# Patient Record
Sex: FEMALE | Race: WHITE | NOT HISPANIC OR LATINO | Employment: OTHER | URBAN - NONMETROPOLITAN AREA
[De-identification: names, ages, dates, MRNs, and addresses within clinical notes are randomized per-mention and may not be internally consistent; named-entity substitution may affect disease eponyms.]

---

## 2024-04-22 ENCOUNTER — APPOINTMENT (OUTPATIENT)
Dept: CARDIOLOGY | Facility: HOSPITAL | Age: 84
End: 2024-04-22
Payer: MEDICARE

## 2024-04-22 ENCOUNTER — APPOINTMENT (OUTPATIENT)
Dept: RADIOLOGY | Facility: HOSPITAL | Age: 84
End: 2024-04-22
Payer: MEDICARE

## 2024-04-22 ENCOUNTER — HOSPITAL ENCOUNTER (EMERGENCY)
Facility: HOSPITAL | Age: 84
Discharge: HOME | End: 2024-04-22
Attending: EMERGENCY MEDICINE
Payer: MEDICARE

## 2024-04-22 VITALS
DIASTOLIC BLOOD PRESSURE: 75 MMHG | WEIGHT: 191.14 LBS | SYSTOLIC BLOOD PRESSURE: 148 MMHG | BODY MASS INDEX: 33.87 KG/M2 | RESPIRATION RATE: 20 BRPM | TEMPERATURE: 98 F | HEART RATE: 84 BPM | HEIGHT: 63 IN | OXYGEN SATURATION: 96 %

## 2024-04-22 DIAGNOSIS — H70.93 MASTOIDITIS OF BOTH SIDES: Primary | ICD-10-CM

## 2024-04-22 LAB
ALBUMIN SERPL BCP-MCNC: 4 G/DL (ref 3.4–5)
ALP SERPL-CCNC: 55 U/L (ref 33–136)
ALT SERPL W P-5'-P-CCNC: 25 U/L (ref 7–45)
ANION GAP SERPL CALC-SCNC: 10 MMOL/L (ref 10–20)
AST SERPL W P-5'-P-CCNC: 24 U/L (ref 9–39)
BASOPHILS # BLD AUTO: 0.07 X10*3/UL (ref 0–0.1)
BASOPHILS NFR BLD AUTO: 0.9 %
BILIRUB SERPL-MCNC: 0.4 MG/DL (ref 0–1.2)
BUN SERPL-MCNC: 24 MG/DL (ref 6–23)
CALCIUM SERPL-MCNC: 9.5 MG/DL (ref 8.6–10.3)
CARDIAC TROPONIN I PNL SERPL HS: 4 NG/L (ref 0–13)
CHLORIDE SERPL-SCNC: 98 MMOL/L (ref 98–107)
CO2 SERPL-SCNC: 31 MMOL/L (ref 21–32)
CREAT SERPL-MCNC: 0.96 MG/DL (ref 0.5–1.05)
EGFRCR SERPLBLD CKD-EPI 2021: 58 ML/MIN/1.73M*2
EOSINOPHIL # BLD AUTO: 0.15 X10*3/UL (ref 0–0.4)
EOSINOPHIL NFR BLD AUTO: 1.9 %
ERYTHROCYTE [DISTWIDTH] IN BLOOD BY AUTOMATED COUNT: 12.6 % (ref 11.5–14.5)
GLUCOSE SERPL-MCNC: 116 MG/DL (ref 74–99)
HCT VFR BLD AUTO: 42.9 % (ref 36–46)
HGB BLD-MCNC: 13.7 G/DL (ref 12–16)
HOLD SPECIMEN: NORMAL
IMM GRANULOCYTES # BLD AUTO: 0.04 X10*3/UL (ref 0–0.5)
IMM GRANULOCYTES NFR BLD AUTO: 0.5 % (ref 0–0.9)
LYMPHOCYTES # BLD AUTO: 1.38 X10*3/UL (ref 0.8–3)
LYMPHOCYTES NFR BLD AUTO: 17.2 %
MCH RBC QN AUTO: 28.4 PG (ref 26–34)
MCHC RBC AUTO-ENTMCNC: 31.9 G/DL (ref 32–36)
MCV RBC AUTO: 89 FL (ref 80–100)
MONOCYTES # BLD AUTO: 0.47 X10*3/UL (ref 0.05–0.8)
MONOCYTES NFR BLD AUTO: 5.9 %
NEUTROPHILS # BLD AUTO: 5.92 X10*3/UL (ref 1.6–5.5)
NEUTROPHILS NFR BLD AUTO: 73.6 %
NRBC BLD-RTO: 0 /100 WBCS (ref 0–0)
PLATELET # BLD AUTO: 265 X10*3/UL (ref 150–450)
POTASSIUM SERPL-SCNC: 4.5 MMOL/L (ref 3.5–5.3)
PROT SERPL-MCNC: 7.1 G/DL (ref 6.4–8.2)
RBC # BLD AUTO: 4.83 X10*6/UL (ref 4–5.2)
SODIUM SERPL-SCNC: 134 MMOL/L (ref 136–145)
WBC # BLD AUTO: 8 X10*3/UL (ref 4.4–11.3)

## 2024-04-22 PROCEDURE — 2500000001 HC RX 250 WO HCPCS SELF ADMINISTERED DRUGS (ALT 637 FOR MEDICARE OP)

## 2024-04-22 PROCEDURE — 84484 ASSAY OF TROPONIN QUANT: CPT | Performed by: EMERGENCY MEDICINE

## 2024-04-22 PROCEDURE — 70450 CT HEAD/BRAIN W/O DYE: CPT

## 2024-04-22 PROCEDURE — 85025 COMPLETE CBC W/AUTO DIFF WBC: CPT | Performed by: EMERGENCY MEDICINE

## 2024-04-22 PROCEDURE — 99285 EMERGENCY DEPT VISIT HI MDM: CPT | Mod: 25

## 2024-04-22 PROCEDURE — 70450 CT HEAD/BRAIN W/O DYE: CPT | Performed by: RADIOLOGY

## 2024-04-22 PROCEDURE — 36415 COLL VENOUS BLD VENIPUNCTURE: CPT | Performed by: EMERGENCY MEDICINE

## 2024-04-22 PROCEDURE — 2500000001 HC RX 250 WO HCPCS SELF ADMINISTERED DRUGS (ALT 637 FOR MEDICARE OP): Performed by: EMERGENCY MEDICINE

## 2024-04-22 PROCEDURE — 80053 COMPREHEN METABOLIC PANEL: CPT | Performed by: EMERGENCY MEDICINE

## 2024-04-22 PROCEDURE — 93005 ELECTROCARDIOGRAM TRACING: CPT

## 2024-04-22 RX ORDER — OMEPRAZOLE 20 MG/1
20 CAPSULE, DELAYED RELEASE ORAL DAILY
COMMUNITY

## 2024-04-22 RX ORDER — MECLIZINE HYDROCHLORIDE 25 MG/1
25 TABLET ORAL 4 TIMES DAILY
Qty: 28 TABLET | Refills: 0 | Status: SHIPPED | OUTPATIENT
Start: 2024-04-22 | End: 2024-04-29

## 2024-04-22 RX ORDER — AMITRIPTYLINE HYDROCHLORIDE 25 MG/1
250 TABLET, FILM COATED ORAL NIGHTLY
COMMUNITY
Start: 2023-12-08

## 2024-04-22 RX ORDER — LOVASTATIN 20 MG/1
20 TABLET ORAL NIGHTLY
COMMUNITY

## 2024-04-22 RX ORDER — METOPROLOL SUCCINATE 100 MG/1
100 TABLET, EXTENDED RELEASE ORAL DAILY
COMMUNITY
Start: 2023-12-11

## 2024-04-22 RX ORDER — AMOXICILLIN AND CLAVULANATE POTASSIUM 875; 125 MG/1; MG/1
1 TABLET, FILM COATED ORAL ONCE
Status: COMPLETED | OUTPATIENT
Start: 2024-04-22 | End: 2024-04-22

## 2024-04-22 RX ORDER — LISINOPRIL AND HYDROCHLOROTHIAZIDE 12.5; 2 MG/1; MG/1
1 TABLET ORAL DAILY
COMMUNITY

## 2024-04-22 RX ORDER — AMOXICILLIN AND CLAVULANATE POTASSIUM 875; 125 MG/1; MG/1
1 TABLET, FILM COATED ORAL EVERY 12 HOURS
Qty: 14 TABLET | Refills: 0 | Status: SHIPPED | OUTPATIENT
Start: 2024-04-22 | End: 2024-04-29

## 2024-04-22 RX ORDER — FLUOXETINE HYDROCHLORIDE 20 MG/1
40 CAPSULE ORAL DAILY
COMMUNITY

## 2024-04-22 RX ORDER — MECLIZINE HYDROCHLORIDE 25 MG/1
25 TABLET ORAL ONCE
Status: COMPLETED | OUTPATIENT
Start: 2024-04-22 | End: 2024-04-22

## 2024-04-22 RX ORDER — AMOXICILLIN AND CLAVULANATE POTASSIUM 875; 125 MG/1; MG/1
TABLET, FILM COATED ORAL
Status: COMPLETED
Start: 2024-04-22 | End: 2024-04-22

## 2024-04-22 RX ORDER — LORATADINE 10 MG/1
10 TABLET ORAL DAILY
Qty: 20 TABLET | Refills: 0 | Status: SHIPPED | OUTPATIENT
Start: 2024-04-22 | End: 2024-05-12

## 2024-04-22 RX ADMIN — AMOXICILLIN AND CLAVULANATE POTASSIUM 1 TABLET: 875; 125 TABLET, FILM COATED ORAL at 15:03

## 2024-04-22 RX ADMIN — MECLIZINE HYDROCHLORIDE 25 MG: 25 TABLET ORAL at 14:07

## 2024-04-22 ASSESSMENT — PAIN SCALES - GENERAL
PAINLEVEL_OUTOF10: 0 - NO PAIN
PAINLEVEL_OUTOF10: 0 - NO PAIN

## 2024-04-22 ASSESSMENT — PAIN - FUNCTIONAL ASSESSMENT: PAIN_FUNCTIONAL_ASSESSMENT: 0-10

## 2024-04-22 NOTE — ED PROVIDER NOTES
[facility]   ED  Provider Note  4/22/2024  1:32 PM  AC02/AC02      Chief Complaint   Patient presents with    Shortness of Breath        History of Present Illness:   Anais Gamez is a 83 y.o. female presenting to the ED for shortness of breath, decreased hearing of the left ear, lightheadedness, nausea beginning a few days ago.  The complaint has been intermittent, moderate in severity, and worsened by nothing.  Patient has a long history of COPD.  She has chronic shortness of breath.  She states the reason she came to the ER today is because she has decreased hearing for left ear and is feeling lightheaded with nausea.  She denies any actual vomiting.  She had no change in oral intake.  Is been no recent change in medication.  She denies any diarrhea or constipation.  She denies abdominal pain.      Review of Systems:   Pertinent positives and review of systems as noted above.  Remaining 10 review of systems is negative or noncontributory to today's episode of care.  Review of Systems       --------------------------------------------- PAST HISTORY ---------------------------------------------  Past Medical History:   Past Medical History:   Diagnosis Date    Hypertension    CKD, restless leg, Diabetes, breast cancer post mastectomy     Past Surgical History: History reviewed. No pertinent surgical history.     Social History:   Social History     Social History Narrative    Not on file        Family History: family history is not on file. Unless otherwise noted, family history is non contributory    Patient's Medications    No medications on file      The patient’s home medications have been reviewed.    Allergies: Patient has no known allergies.    -------------------------------------------------- RESULTS -------------------------------------------------  All laboratory and radiology results have been personally reviewed by myself   LABS:  Labs Reviewed   CBC WITH AUTO DIFFERENTIAL - Abnormal       Result  Value    WBC 8.0      nRBC 0.0      RBC 4.83      Hemoglobin 13.7      Hematocrit 42.9      MCV 89      MCH 28.4      MCHC 31.9 (*)     RDW 12.6      Platelets 265      Neutrophils % 73.6      Immature Granulocytes %, Automated 0.5      Lymphocytes % 17.2      Monocytes % 5.9      Eosinophils % 1.9      Basophils % 0.9      Neutrophils Absolute 5.92 (*)     Immature Granulocytes Absolute, Automated 0.04      Lymphocytes Absolute 1.38      Monocytes Absolute 0.47      Eosinophils Absolute 0.15      Basophils Absolute 0.07     COMPREHENSIVE METABOLIC PANEL - Abnormal    Glucose 116 (*)     Sodium 134 (*)     Potassium 4.5      Chloride 98      Bicarbonate 31      Anion Gap 10      Urea Nitrogen 24 (*)     Creatinine 0.96      eGFR 58 (*)     Calcium 9.5      Albumin 4.0      Alkaline Phosphatase 55      Total Protein 7.1      AST 24      Bilirubin, Total 0.4      ALT 25     TROPONIN I, HIGH SENSITIVITY     EKG: Normal sinus rhythm at 86 bpm, loss of anterior episode, nonspecific ST changes, low voltage QRS complexes, no acute ST elevations.  Interpreted by SANTIAGO Hamm MD    RADIOLOGY:  Interpreted by Radiologist.  CT head wo IV contrast   Final Result   No acute intracranial pathologic findings are identified.   Age-related intracranial findings are present.   Opacification of the mastoid air cells with some soft tissue density   also noted within the middle ear cavities bilaterally, more marked on   the right. Findings raise the possibility of underlying otitis media   and mastoiditis bilaterally. Soft tissue densities within middle ear   cavity is may also be secondary to other entities such as   cholesteatoma.        MACRO:   none        Signed by: Michel Del Cid 4/22/2024 2:33 PM   Dictation workstation:   [id number]          No results found for this or any previous visit (from the past 4464 hour(s)).  ------------------------- NURSING NOTES AND VITALS REVIEWED ---------------------------   The nursing notes  "within the ED encounter and vital signs as below have been reviewed.   BP (!) 146/109   Pulse 91   Temp 36.7 °C (98 °F) (Tympanic)   Resp 18   Ht 1.6 m (5' 3\")   Wt 86.7 kg (191 lb 2.2 oz)   SpO2 (!) 89%   BMI 33.86 kg/m²   Oxygen Saturation Interpretation: Normal      ---------------------------------------------------PHYSICAL EXAM--------------------------------------  Physical Exam   Constitutional/General: Alert and oriented x3, well appearing, non toxic in NAD  Head: Normocephalic and atraumatic  Eyes: PERRL, EOMI, conjunctiva normal, sclera non icteric  Ears: Patient has bilateral serous otitis without evidence of erythema.  She does have mild mastoid tenderness as well.  Mouth: Oropharynx clear, handling secretions, no trismus, no asymmetry of the posterior oropharynx or uvular edema  Neck: Supple, full ROM, non tender to palpation in the midline, no stridor, no crepitus, no meningeal signs  Respiratory: Lungs clear to auscultation bilaterally, no wheezes, rales, or rhonchi. Not in respiratory distress  Cardiovascular:  Regular rate. Regular rhythm. No murmurs, gallops, or rubs. 2+ distal pulses  Chest: No chest wall tenderness  GI:  Abdomen Soft, Non tender, Non distended.  +BS. No organomegaly, no palpable masses,  No rebound, guarding, or rigidity.   Musculoskeletal: Moves all extremities x 4. Warm and well perfused, no clubbing, cyanosis, or edema. Capillary refill <3 seconds  Integument: skin warm and dry. No rashes.   Lymphatic: no lymphadenopathy noted  Neurologic: No focal deficits, symmetric strength 5/5 in the upper and lower extremities bilaterally  Psychiatric: Normal Affect    Procedures    ------------------------------ ED COURSE/MEDICAL DECISION MAKING----------------------  Diagnoses as of 04/22/24 1454   Mastoiditis of both sides      Patient has fluid behind your ears without significant signs of infection.  CAT scan reveals fluid in the mastoids as well.  This would explain the " patient's feeling lightheaded and dizzy.  The patient was started on Claritin and Augmentin and asked to follow-up with her primary care doctor 1 week if not better.      Medical Decision Making:   Discharged to home  Diagnoses as of 04/22/24 1454   Mastoiditis of both sides      Counseling:   The emergency provider has spoken with the patient and family member patient and daughter and discussed today’s results, in addition to providing specific details for the plan of care and counseling regarding the diagnosis and prognosis.  Questions are answered at this time and they are agreeable with the plan.      --------------------------------- IMPRESSION AND DISPOSITION ---------------------------------        IMPRESSION  1. Mastoiditis of both sides        DISPOSITION  Disposition: Discharge to home  Patient condition is fair      Billing Provider Critical Care Time: 0 minutes     Lorenzo Hamm MD  04/25/24 0823       Lorenzo Hamm MD  05/11/24 1035       Lorenzo Hamm MD  05/11/24 1037

## 2024-04-22 NOTE — DISCHARGE INSTRUCTIONS
Claritin, Augmentin and meclizine as prescribed.    Follow-up with your primary care doctor in 7 to 10 days for recheck.    Return for worsening symptoms or concerns.

## 2024-04-24 LAB
ATRIAL RATE: 86 BPM
P AXIS: 50 DEGREES
P OFFSET: 186 MS
P ONSET: 132 MS
PR INTERVAL: 200 MS
Q ONSET: 232 MS
QRS COUNT: 14 BEATS
QRS DURATION: 68 MS
QT INTERVAL: 374 MS
QTC CALCULATION(BAZETT): 447 MS
QTC FREDERICIA: 421 MS
R AXIS: -9 DEGREES
T AXIS: 44 DEGREES
T OFFSET: 419 MS
VENTRICULAR RATE: 86 BPM

## 2024-05-20 ENCOUNTER — CLINICAL SUPPORT (OUTPATIENT)
Dept: AUDIOLOGY | Facility: CLINIC | Age: 84
End: 2024-05-20
Payer: MEDICARE

## 2024-05-20 ENCOUNTER — OFFICE VISIT (OUTPATIENT)
Dept: OTOLARYNGOLOGY | Facility: CLINIC | Age: 84
End: 2024-05-20
Payer: MEDICARE

## 2024-05-20 VITALS — TEMPERATURE: 98.9 F | WEIGHT: 191 LBS | BODY MASS INDEX: 33.84 KG/M2 | HEIGHT: 63 IN

## 2024-05-20 DIAGNOSIS — H70.93 MASTOIDITIS OF BOTH SIDES: Primary | ICD-10-CM

## 2024-05-20 DIAGNOSIS — H90.6 MIXED CONDUCTIVE AND SENSORINEURAL HEARING LOSS OF BOTH EARS: ICD-10-CM

## 2024-05-20 DIAGNOSIS — H90.3 SENSORINEURAL HEARING LOSS (SNHL) OF BOTH EARS: ICD-10-CM

## 2024-05-20 DIAGNOSIS — H65.03 NON-RECURRENT ACUTE SEROUS OTITIS MEDIA OF BOTH EARS: Primary | ICD-10-CM

## 2024-05-20 DIAGNOSIS — R42 DIZZINESS: ICD-10-CM

## 2024-05-20 DIAGNOSIS — H69.93 DYSFUNCTION OF BOTH EUSTACHIAN TUBES: ICD-10-CM

## 2024-05-20 PROCEDURE — 1159F MED LIST DOCD IN RCRD: CPT | Performed by: NURSE PRACTITIONER

## 2024-05-20 PROCEDURE — 1036F TOBACCO NON-USER: CPT | Performed by: NURSE PRACTITIONER

## 2024-05-20 PROCEDURE — 92550 TYMPANOMETRY & REFLEX THRESH: CPT | Performed by: AUDIOLOGIST

## 2024-05-20 PROCEDURE — 1160F RVW MEDS BY RX/DR IN RCRD: CPT | Performed by: NURSE PRACTITIONER

## 2024-05-20 PROCEDURE — 92557 COMPREHENSIVE HEARING TEST: CPT | Performed by: AUDIOLOGIST

## 2024-05-20 PROCEDURE — 99203 OFFICE O/P NEW LOW 30 MIN: CPT | Performed by: NURSE PRACTITIONER

## 2024-05-20 RX ORDER — AZELASTINE 1 MG/ML
2 SPRAY, METERED NASAL 2 TIMES DAILY
Qty: 30 ML | Refills: 11 | Status: SHIPPED | OUTPATIENT
Start: 2024-05-20 | End: 2025-05-20

## 2024-05-20 RX ORDER — PRAMIPEXOLE DIHYDROCHLORIDE 0.5 MG/1
1 TABLET ORAL NIGHTLY
COMMUNITY
Start: 2024-02-05

## 2024-05-20 RX ORDER — FLUOXETINE HYDROCHLORIDE 40 MG/1
CAPSULE ORAL
COMMUNITY
Start: 2024-01-29

## 2024-05-20 RX ORDER — HYDROCODONE BITARTRATE AND ACETAMINOPHEN 7.5; 325 MG/1; MG/1
1 TABLET ORAL 2 TIMES DAILY PRN
COMMUNITY
Start: 2024-04-26

## 2024-05-20 RX ORDER — FLUTICASONE PROPIONATE 50 MCG
1 SPRAY, SUSPENSION (ML) NASAL 2 TIMES DAILY
Qty: 16 G | Refills: 11 | Status: SHIPPED | OUTPATIENT
Start: 2024-05-20 | End: 2025-05-20

## 2024-05-20 RX ORDER — MECLIZINE HYDROCHLORIDE 25 MG/1
TABLET ORAL
COMMUNITY
Start: 2024-05-16

## 2024-05-20 ASSESSMENT — PATIENT HEALTH QUESTIONNAIRE - PHQ9
SUM OF ALL RESPONSES TO PHQ9 QUESTIONS 1 & 2: 0
2. FEELING DOWN, DEPRESSED OR HOPELESS: NOT AT ALL
1. LITTLE INTEREST OR PLEASURE IN DOING THINGS: NOT AT ALL

## 2024-05-20 NOTE — PROGRESS NOTES
"Subjective   Patient ID: Anais Gamez is a 83 y.o. female who presents for Follow-up.  HPI  The patient is referred from the ED for evaluation of a bilateral ear infection.  When asked about ear pain, hearing loss, discharge from ear, tinnitus, aural fullness or autophony in the affected ear or ears, the patient admits to bilateral worsening of hearing loss and dizziness.  Patient lives in [place] but is staying with local family for an extended period of time.  She describes her dizziness as \"like I am about to pass out.\"  She has had a few isolated episodes of vertigo as well.  Her family states that the worst of these occurred after showering.  Family stated \"I had to sit with her for 2 hours while she was very dizzy.\"  They have checked blood pressures at home which have not been concerning.  She is taking meclizine 3 times daily and finished her oral antibiotics today.  She is not taking any nasal sprays.  When asked about a significant past otological history including history of prior ear surgery, noise exposure, exposure to ototoxic drugs or agents, and/or family history of hearing loss, the patient admits to none.    Review of Systems  A comprehensive or 10 points review of the patient´s constitutional, neurological, HEENT, pulmonary, cardiovascular and genito-urinary systems showed only those mentioned in history of present illness.    Objective   Physical Exam  Constitutional: no fever, chills, weight loss or weight gain   General appearance: Appears well, well-nourished, well groomed. No acute distress.   Communication: Normal communication   Psychiatric: Oriented to person, place and time. Normal mood and affect.   Neurologic: Cranial nerves II-XII grossly intact and symmetric bilaterally.   Head and Face:   Head: Atraumatic with no masses, lesions or scarring.   Face: Normal symmetry, no paralysis, synkinesis or facial tic. No scars or deformities.     Eyes: Conjunctiva not edematous or " erythematous   Ears: External inspection of ears with no deformity, scars or masses. Bilateral EACs clear.  Both TMs with moderate radames effusions.    Neck: Normal appearing, symmetric, trachea midline.   Cardiovascular: Examination of peripheral vascular system shows no clubbing or cyanosis.   Respiratory: No respiratory distress increased work of breathing. Inspection of the chest with symmetric chest expansion and normal respiratory effort.   Skin: No rashes in the head or neck  My interpretation of the audiogram done today is moderate to severe mixed hearing loss bilaterally.  Excellent word recognition scores and type B tympanograms with normal canal volume bilaterally.  Assessment/Plan        This patient presents for initial evaluation of acute acquired bilateral serous otitis media, bilateral mixed hearing loss, bilateral eustachian tube dysfunction, and dizziness.    Reassurance given that there is no sign of current infection.  I recommended a 6-week course of Flonase and azelastine.  Patient was instructed on proper technique.  I also recommended cycled Afrin and frequent insufflation of the ears.  Our office will contact family to arrange for possible PE tube placement prior to her going back to [place] in July.  We discussed that presyncope is not a symptom associated with peripheral vestibular disorders.  We discussed that this fluid behind her eardrums may make her feel unsteady, but should not cause presyncope or vertigo.  I would reevaluate the symptoms after her otitis media has resolved completely.  Patient and family are in agreement with the plan.  All questions were answered to patient and family's satisfaction.    This note was created using speech recognition transcription software. Despite proofreading, several typographical errors might be present that might affect the meaning of the content. Please call with any questions.      CESAR Charles-CNP 05/20/24 1:33 PM

## 2024-05-20 NOTE — PATIENT INSTRUCTIONS
Your exam and hearing test today are consistent with noninfected fluid behind both eardrums.  Recommendations:     1. Fluticasone-steroid nasal spray will be sent to your pharmacy. Use 1 spray each side morning and night ×6 weeks.  Remember to angle outward when spraying  2.  Azelastine -antihistamine nasal spray will be sent to your pharmacy.  Use 2 sprays each side morning and night x 6 weeks.  Remember to angle at work when spraying.  3. Get Afrin nasal spray which is over-the-counter. Use 2 sprays each side morning and night for 3 days in a row, then take 1 day off. You may repeat a total of 3 cycles.  4. Gently pop your ears 10 times per day    My office will contact you to be scheduled with one of my surgical partners  sometime in June.  Please contact my office 2 weeks prior to that appointment if symptoms have not changed at all and I would order a new CAT scan.    Fluid behind both eardrums may affect your equilibrium, but should not make you feel as if you are about to pass out.  I would reevaluate the symptoms after your chronic otitis media has resolved.

## 2024-05-20 NOTE — PROGRESS NOTES
Chief Complaint   Patient presents with    Hearing Loss      HISTORY:  Anais Gamez, age 83 years, was seen for audiogram in conjunction with otolaryngology appointment on 5/20/2024.  Ms. Gamez presents with complaint of hearing loss, ear pain and dizziness.  She was seen in the emergency department and was diagnosed with bilateral mastoiditis and possible cholesteatoma.  She reports she may hear better out of her right ear.  There is no ear drainage noted.  She has been on antibiotics for two weeks.    RESULTS:  Prior to testing both external auditory canals were clear and tympanic membranes visualized    Immittance and acoustic reflexes:  Immittance testing yielded TYPE B tympanograms indicating abnormal middle ear function both ears  Acoustic reflexes were present 500 - 4000 Hz both ears    Audiogram:  Moderate to profound mixed hearing loss 250 - 8000 Hz both ears  Speech reception thresholds obtained at 55 dBHL right ear and 60 dBHL left ear  Speech discrimination scores were 100% right ear and 96% left ear at 80 dBHL    IMPRESSIONS:  Abnormal middle ear function noted both ears  Moderate to profound mixed hearing loss    RECOMMENDATIONS:  1.  Follow up with otolaryngology  2.  Retest hearing levels after otological management    time: 1146 - 1206

## 2024-05-21 ENCOUNTER — APPOINTMENT (OUTPATIENT)
Dept: AUDIOLOGY | Facility: CLINIC | Age: 84
End: 2024-05-21
Payer: MEDICARE

## 2024-05-21 ENCOUNTER — APPOINTMENT (OUTPATIENT)
Dept: OTOLARYNGOLOGY | Facility: CLINIC | Age: 84
End: 2024-05-21
Payer: MEDICARE

## 2024-06-17 DIAGNOSIS — H90.6 MIXED CONDUCTIVE AND SENSORINEURAL HEARING LOSS OF BOTH EARS: Primary | ICD-10-CM

## 2024-06-25 ENCOUNTER — HOSPITAL ENCOUNTER (OUTPATIENT)
Dept: RADIOLOGY | Facility: HOSPITAL | Age: 84
Discharge: HOME | End: 2024-06-25
Payer: MEDICARE

## 2024-06-25 DIAGNOSIS — H90.6 MIXED CONDUCTIVE AND SENSORINEURAL HEARING LOSS OF BOTH EARS: ICD-10-CM

## 2024-06-25 PROCEDURE — 70480 CT ORBIT/EAR/FOSSA W/O DYE: CPT

## 2024-06-25 PROCEDURE — 70480 CT ORBIT/EAR/FOSSA W/O DYE: CPT | Performed by: RADIOLOGY

## 2024-06-27 ENCOUNTER — APPOINTMENT (OUTPATIENT)
Dept: OTOLARYNGOLOGY | Facility: CLINIC | Age: 84
End: 2024-06-27
Payer: MEDICARE

## 2024-06-27 VITALS — HEIGHT: 63 IN | TEMPERATURE: 98 F | BODY MASS INDEX: 33.83 KG/M2

## 2024-06-27 DIAGNOSIS — H65.413 CHRONIC ALLERGIC OTITIS MEDIA OF BOTH EARS: Primary | ICD-10-CM

## 2024-06-27 DIAGNOSIS — H69.93 DYSFUNCTION OF BOTH EUSTACHIAN TUBES: ICD-10-CM

## 2024-06-27 DIAGNOSIS — H90.0 CONDUCTIVE HEARING LOSS, BILATERAL: ICD-10-CM

## 2024-06-27 PROCEDURE — 1160F RVW MEDS BY RX/DR IN RCRD: CPT | Performed by: NURSE PRACTITIONER

## 2024-06-27 PROCEDURE — 1036F TOBACCO NON-USER: CPT | Performed by: NURSE PRACTITIONER

## 2024-06-27 PROCEDURE — 1159F MED LIST DOCD IN RCRD: CPT | Performed by: NURSE PRACTITIONER

## 2024-06-27 PROCEDURE — 99214 OFFICE O/P EST MOD 30 MIN: CPT | Performed by: NURSE PRACTITIONER

## 2024-06-27 PROCEDURE — 69433 CREATE EARDRUM OPENING: CPT | Performed by: NURSE PRACTITIONER

## 2024-06-27 RX ORDER — CIPROFLOXACIN AND DEXAMETHASONE 3; 1 MG/ML; MG/ML
4 SUSPENSION/ DROPS AURICULAR (OTIC) 2 TIMES DAILY
Qty: 7.5 ML | Refills: 0 | Status: SHIPPED | OUTPATIENT
Start: 2024-06-27 | End: 2024-07-04

## 2024-06-27 ASSESSMENT — PATIENT HEALTH QUESTIONNAIRE - PHQ9
SUM OF ALL RESPONSES TO PHQ9 QUESTIONS 1 AND 2: 0
1. LITTLE INTEREST OR PLEASURE IN DOING THINGS: NOT AT ALL
2. FEELING DOWN, DEPRESSED OR HOPELESS: NOT AT ALL

## 2024-06-28 NOTE — PROGRESS NOTES
Subjective   Patient ID: Anais Gamez is a 83 y.o. female who presents for Follow-up (Ct scan for ear tubes).  HPI    Patient presents today for bilateral ear tube placement.  Patient was referred by Citlalli Briggs CNP.  Patient reports that she tried topical nasal sprays to relieve the middle ear effusion but it has not cleared.  She has hearing loss in both the ears due to the fluid.    Review of Systems    All other systems have been reviewed and are negative for complaints except for those mentioned in history of present illness, past medical history and problem list       Objective   Physical Exam    Bilateral external ears normal.  Bilateral EAC clear.  Bilateral TM with large middle ear effusion radames-colored and TM is slightly retracted.    Patient ID: Anais Gamez is a 83 y.o. female.    Procedures    The risks and benefits of myringotomy discussed with patient.  Bilateral tympanostomy with PE tube insertion was agreed upon.  Patient signed consent form.  Patient was reclined. Using binocular microscope, the appropriate size speculum was inserted in the ear. The TM visualized. Phenol was applied to the posterior inferior aspect of TM. Allowed few seconds for blanching. Radial incision was made with bi-beveled knife. Middle ear fluid suctioned. Fluid  appears radames colored and clear. Incision inspected. No bleeding. Patient returned to upright position. Patient tolerated procedure well.     Assessment/Plan       1. 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 Dysfunction of both eustachian tubes        3. Conductive hearing loss, bilateral [H90.0]          PE tube insertion performed in both the ears today.  Patient tolerated procedure well.  Patient was able to hear better after the procedure.  Advised to use the topical drops as prescribed.  She may follow-up as needed as she will be going back to [facility] next week.          CESAR Guerra-MITCH 06/28/24 9:53 AM

## 2024-06-28 NOTE — PATIENT INSTRUCTIONS
It is normal to have blood-tinged drainage after the procedure 24 to 48 hours later.  Please use the topical antibiotics as prescribed.  Call the office if there is any increasing pain or purulent drainage.

## 2024-07-17 DIAGNOSIS — H65.413 CHRONIC ALLERGIC OTITIS MEDIA OF BOTH EARS: Primary | ICD-10-CM

## 2024-07-17 DIAGNOSIS — H90.6 MIXED CONDUCTIVE AND SENSORINEURAL HEARING LOSS OF BOTH EARS: ICD-10-CM

## 2024-07-17 NOTE — PROGRESS NOTES
Patient s/p bilateral ear tube 6/27/2024. She has returned to [place] and requesting a  referral to ENT there for follow up.    Referral placed.